# Patient Record
Sex: FEMALE | Race: OTHER | NOT HISPANIC OR LATINO | Employment: FULL TIME | URBAN - NONMETROPOLITAN AREA
[De-identification: names, ages, dates, MRNs, and addresses within clinical notes are randomized per-mention and may not be internally consistent; named-entity substitution may affect disease eponyms.]

---

## 2017-07-23 ENCOUNTER — OFFICE VISIT (OUTPATIENT)
Dept: URGENT CARE | Facility: PHYSICIAN GROUP | Age: 32
End: 2017-07-23
Payer: COMMERCIAL

## 2017-07-23 VITALS
RESPIRATION RATE: 16 BRPM | TEMPERATURE: 97.4 F | SYSTOLIC BLOOD PRESSURE: 108 MMHG | OXYGEN SATURATION: 97 % | WEIGHT: 160 LBS | HEART RATE: 80 BPM | DIASTOLIC BLOOD PRESSURE: 60 MMHG | HEIGHT: 64 IN | BODY MASS INDEX: 27.31 KG/M2

## 2017-07-23 DIAGNOSIS — S46.911A RIGHT SHOULDER STRAIN, INITIAL ENCOUNTER: ICD-10-CM

## 2017-07-23 PROCEDURE — 99214 OFFICE O/P EST MOD 30 MIN: CPT | Performed by: PHYSICIAN ASSISTANT

## 2017-07-23 RX ORDER — NAPROXEN 500 MG/1
500 TABLET ORAL 2 TIMES DAILY WITH MEALS
Qty: 30 TAB | Refills: 0 | Status: SHIPPED | OUTPATIENT
Start: 2017-07-23 | End: 2018-04-29

## 2017-07-23 NOTE — MR AVS SNAPSHOT
"        Lucia Dixon   2017 10:25 AM   Office Visit   MRN: [id number]    Department:  [facility]   Dept Phone:  [phone]    Description:  Female : 1985   Provider:  Suzie Marques PA-C           Reason for Visit     Shoulder Injury Prior injury, pain reoccurred yesterday      Allergies as of 2017     Allergen Noted Reactions    Augmentin 2012       Zith [Azithromycin] 2016   Vomiting      You were diagnosed with     Right shoulder strain, initial encounter   [[id number]]         Vital Signs     Blood Pressure Pulse Temperature Respirations Height Weight    108/60 mmHg 80 36.3 °C (97.4 °F) 16 1.626 m (5' 4\") 72.576 kg (160 lb)    Body Mass Index Oxygen Saturation Smoking Status             27.45 kg/m2 97% Never Smoker          Basic Information     Date Of Birth Sex Race Ethnicity Preferred Language    1985 Female White Non- English      Health Maintenance        Date Due Completion Dates    IMM DTaP/Tdap/Td Vaccine (1 - Tdap) 10/18/2002 ---    PAP SMEAR 10/18/2004 ---    IMM INFLUENZA (1) 2017 ---            Current Immunizations     No immunizations on file.      Below and/or attached are the medications your provider expects you to take. Review all of your home medications and newly ordered medications with your provider and/or pharmacist. Follow medication instructions as directed by your provider and/or pharmacist. Please keep your medication list with you and share with your provider. Update the information when medications are discontinued, doses are changed, or new medications (including over-the-counter products) are added; and carry medication information at all times in the event of emergency situations     Allergies:  AUGMENTIN - (reactions not documented)     ZITH - Vomiting               Medications  Valid as of: 2017 - 11:17 AM    Generic Name Brand Name Tablet Size Instructions for use    Drospirenone-Ethinyl Estradiol (Tab) " HUMBLE 3-0.02 MG Take 1 Tab by mouth every day.          Naproxen (Tab) NAPROSYN 375 MG Take 1 Tab by mouth 2 times a day, with meals.        Naproxen (Tab) NAPROSYN 500 MG Take 1 Tab by mouth 2 times a day as needed.        Naproxen (Tab) NAPROSYN 500 MG Take 1 Tab by mouth 2 times a day, with meals.        Probiotic Product   Take  by mouth.        .                 Medicines prescribed today were sent to:     [id number] - [place], [place] - [address] AT [facility] [address]    [address] [facility] [address]    Phone: [phone] Fax: [phone]    Open 24 Hours?: No      Medication refill instructions:       If your prescription bottle indicates you have medication refills left, it is not necessary to call your provider’s office. Please contact your pharmacy and they will refill your medication.    If your prescription bottle indicates you do not have any refills left, you may request refills at any time through one of the following ways: The online [facility] [facility] system (except Urgent Care), by calling your provider’s office, or by asking your pharmacy to contact your provider’s office with a refill request. Medication refills are processed only during regular business hours and may not be available until the next business day. Your provider may request additional information or to have a follow-up visit with you prior to refilling your medication.   *Please Note: Medication refills are assigned a new Rx number when refilled electronically. Your pharmacy may indicate that no refills were authorized even though a new prescription for the same medication is available at the pharmacy. Please request the medicine by name with the pharmacy before contacting your provider for a refill.           [facility] [facility] Access Code: [email or web address]  Expires: 8/22/2017 11:17 AM    [facility]  A secure, online tool to manage your health information     [email or web address]’s [employer]® is a secure, online tool that connects you to  your personalized health information from the privacy of your home -- day or night - making it very easy for you to manage your healthcare. Once the activation process is completed, you can even access your medical information using the Fluential bell, which is available for free in the Apple Bell store or Google Play store.     Fluential provides the following levels of access (as shown below):   My Chart Features   [facility] Doctor [facility]  Specialists [facility]  Urgent  Care Non-[facility]  Primary Care  Doctor   Email your healthcare team securely and privately 24/7 X X X    Manage appointments: schedule your next appointment; view details of past/upcoming appointments X      Request prescription refills. X      View recent personal medical records, including lab and immunizations X X X X   View health record, including health history, allergies, medications X X X X   Read reports about your outpatient visits, procedures, consult and ER notes X X X X   See your discharge summary, which is a recap of your hospital and/or ER visit that includes your diagnosis, lab results, and care plan. X X       How to register for [employer]:  1. Go to  [email or web address].  2. Click on the Sign Up Now box, which takes you to the New Member Sign Up page. You will need to provide the following information:  a. Enter your Fluential Access Code exactly as it appears at the top of this page. (You will not need to use this code after you’ve completed the sign-up process. If you do not sign up before the expiration date, you must request a new code.)   b. Enter your date of birth.   c. Enter your home email address.   d. Click Submit, and follow the next screen’s instructions.  3. Create a Fluential ID. This will be your Fluential login ID and cannot be changed, so think of one that is secure and easy to remember.  4. Create a Fluential password. You can change your password at any time.  5. Enter your Password Reset Question and Answer.  This can be used at a later time if you forget your password.   6. Enter your e-mail address. This allows you to receive e-mail notifications when new information is available in [employer].  7. Click Sign Up. You can now view your health information.    For assistance activating your [employer] account, call [phone]

## 2017-07-23 NOTE — PROGRESS NOTES
Chief Complaint   Patient presents with   • Shoulder Injury     Prior injury, pain reoccurred yesterday       HISTORY OF PRESENT ILLNESS: Patient is a 31 y.o. female who presents today for evaluation of right shoulder pain. Patient states she woke up yesterday morning with pain, denying any injury. She does have a history of injury approximately 2 years ago but was cleared and has not had any pain. She complains of pain deep in the right shoulder without any radiation or distal paresthesias. She has decreased range of motion due to pain. She has worsening pain with abduction. She has taken over-the-counter Tylenol and prescription naproxen with minimal relief. 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 Patient does report some mild pain in the supraclavicular region on the right side and discomfort with taking a deep breath. Patient is on birth control pills but denies any long trips, personal history of cancer or DVT.    There are no active problems to display for this patient.      Allergies:Augmentin and Zith    Current Outpatient Prescriptions Ordered in [facility]   Medication Sig Dispense Refill   • naproxen (NAPROSYN) 500 MG Tab Take 1 Tab by mouth 2 times a day, with meals. 30 Tab 0   • naproxen (NAPROSYN) 500 MG Tab Take 1 Tab by mouth 2 times a day as needed. 30 Tab 0   • naproxen (NAPROSYN) 375 MG Tab Take 1 Tab by mouth 2 times a day, with meals. 60 Tab 0   • Probiotic Product (PROBIOTIC DAILY PO) Take  by mouth.     • drospirenone-ethinyl estradiol (GIANVI) 3-0.02 MG per tablet Take 1 Tab by mouth every day.         No current Epic-ordered facility-administered medications on file.       No past medical history on file.    Social History   Substance Use Topics   • Smoking status: Never Smoker    • Smokeless tobacco: Never Used   • Alcohol Use: Yes      Comment: 1-2       Family Status   Relation Status Death Age   • Mother Alive    • Father Alive    No family history on file.    ROS:  "  Review of Systems   Constitutional: Negative for fever, chills, weight loss and malaise/fatigue.   HENT: Negative for ear pain, nosebleeds, congestion, sore throat and neck pain.    Eyes: Negative for blurred vision.   Respiratory: Negative for cough, sputum production, shortness of breath and wheezing.    Cardiovascular: Negative for chest pain, palpitations, orthopnea and leg swelling.   Gastrointestinal: Negative for heartburn, nausea, vomiting and abdominal pain.   Genitourinary: Negative for dysuria, urgency and frequency.       Exam:  Blood pressure 108/60, pulse 80, temperature 36.3 °C (97.4 °F), resp. rate 16, height 1.626 m (5' 4\"), weight 72.576 kg (160 lb), SpO2 97 %.  General: Normal appearing. No distress.  HEENT: Head is grossly normal.  Pulmonary: No respiratory distress noted.   Cardiovascular: Radial pulses are strong and equal bilaterally.  Chest: Chest wall is nontender to palpation. Mild TTP in the supraclavicular region on the right side without associated ecchymosis, erythema, or soft tissue swelling.  Neurologic: Grossly nonfocal.  Extremities: Decreased abduction of the right upper extremity due to pain. No localized tenderness noted around the shoulder joint, scapular region, or clavicle on the right side.  Skin: No obvious lesions.  Psych: Normal mood. Alert and oriented x3. Judgment and insight is normal.    Assessment/Plan:  Vital signs are within normal limits. Continue naproxen as indicated. May add acetaminophen for additional pain relief. Follow-up with primary care provider for worsening or persistent symptoms. May need to consider physical therapy.  1. Right shoulder strain, initial encounter  naproxen (NAPROSYN) 500 MG Tab         "

## 2017-08-07 ENCOUNTER — OFFICE VISIT (OUTPATIENT)
Dept: URGENT CARE | Facility: PHYSICIAN GROUP | Age: 32
End: 2017-08-07
Payer: COMMERCIAL

## 2017-08-07 VITALS
HEIGHT: 64 IN | HEART RATE: 76 BPM | WEIGHT: 160 LBS | OXYGEN SATURATION: 97 % | RESPIRATION RATE: 16 BRPM | SYSTOLIC BLOOD PRESSURE: 108 MMHG | TEMPERATURE: 98.4 F | BODY MASS INDEX: 27.31 KG/M2 | DIASTOLIC BLOOD PRESSURE: 72 MMHG

## 2017-08-07 DIAGNOSIS — L08.9: ICD-10-CM
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
## 2017-08-07 DIAGNOSIS — S60.922A: ICD-10-CM

## 2017-08-07 PROCEDURE — 99214 OFFICE O/P EST MOD 30 MIN: CPT | Performed by: PHYSICIAN ASSISTANT

## 2017-08-07 RX ORDER — DOXYCYCLINE HYCLATE 100 MG
100 TABLET ORAL 2 TIMES DAILY
Qty: 20 TAB | Refills: 0 | Status: SHIPPED | OUTPATIENT
Start: 2017-08-07 | End: 2018-04-29

## 2017-08-08 NOTE — PROGRESS NOTES
Chief Complaint   Patient presents with   • Animal Bite       HISTORY OF PRESENT ILLNESS: Patient is a 31 y.o. female who presents today for evaluation of a dog bite on her left hand. Patient states her dog bit her in a non-vicious manner and has had worsening pain, redness, and swelling over the first metatarsal on the left hand. Patient denies any fevers, chills, chills, drainage from the wound.    There are no active problems to display for this patient.      Allergies:Augmentin and Zith    Current Outpatient Prescriptions Ordered in [facility]   Medication Sig Dispense Refill   • doxycycline (VIBRAMYCIN) 100 MG Tab Take 1 Tab by mouth 2 times a day. 20 Tab 0   • naproxen (NAPROSYN) 500 MG Tab Take 1 Tab by mouth 2 times a day, with meals. 30 Tab 0   • naproxen (NAPROSYN) 500 MG Tab Take 1 Tab by mouth 2 times a day as needed. 30 Tab 0   • Probiotic Product (PROBIOTIC DAILY PO) Take  by mouth.     • naproxen (NAPROSYN) 375 MG Tab Take 1 Tab by mouth 2 times a day, with meals. 60 Tab 0   • drospirenone-ethinyl estradiol (GIANVI) 3-0.02 MG per tablet Take 1 Tab by mouth every day.         No current Epic-ordered facility-administered medications on file.       No past medical history on file.    Social History   Substance Use Topics   • Smoking status: Never Smoker    • Smokeless tobacco: Never Used   • Alcohol Use: Yes      Comment: 1-2       Family Status   Relation Status Death Age   • Mother Alive    • Father Alive    No family history on file.    ROS:    Review of Systems   Constitutional: Negative for fever, chills, weight loss and malaise/fatigue.   HENT: Negative for ear pain, nosebleeds, congestion, sore throat and neck pain.    Eyes: Negative for blurred vision.   Respiratory: Negative for cough, sputum production, shortness of breath and wheezing.    Cardiovascular: Negative for chest pain, palpitations, orthopnea and leg swelling.   Gastrointestinal: Negative for heartburn, nausea, vomiting and abdominal  "pain.   Genitourinary: Negative for dysuria, urgency and frequency.       Exam:  Blood pressure 108/72, pulse 76, temperature 36.9 °C (98.4 °F), resp. rate 16, height 1.626 m (5' 4.02\"), weight 72.576 kg (160 lb), SpO2 97 %.  General: Normal appearing. No distress.  HEENT: Head is grossly normal.  Pulmonary: No respiratory distress noted.  Cardiovascular: Cap refill is less than 2 seconds in the left hand.  Neurologic: No sensory deficit noted.  Extremities: Trace edema noted over the first metacarpal of the left hand with associated redness and tenderness.  Skin: 2 cm abrasion noted over the first metacarpal of the left hand.  Psych: Normal mood. Alert and oriented x3. Judgment and insight is normal.    Assessment/Plan:  Take all medications as directed. Follow up for worsening or persistent symptoms.  1. Dog bite, initial encounter     2. Injury, superficial, hand with infection, left, initial encounter  doxycycline (VIBRAMYCIN) 100 MG Tab         "

## 2017-09-18 ENCOUNTER — OFFICE VISIT (OUTPATIENT)
Dept: URGENT CARE | Facility: CLINIC | Age: 32
End: 2017-09-18
Payer: COMMERCIAL

## 2017-09-18 VITALS
WEIGHT: 160 LBS | BODY MASS INDEX: 27.31 KG/M2 | SYSTOLIC BLOOD PRESSURE: 108 MMHG | DIASTOLIC BLOOD PRESSURE: 68 MMHG | HEIGHT: 64 IN | HEART RATE: 73 BPM | TEMPERATURE: 98.7 F | OXYGEN SATURATION: 100 % | RESPIRATION RATE: 14 BRPM

## 2017-09-18 DIAGNOSIS — S61.258A DOG BITE OF INDEX FINGER, INITIAL ENCOUNTER: ICD-10-CM
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
## 2017-09-18 PROCEDURE — 90471 IMMUNIZATION ADMIN: CPT | Performed by: PHYSICIAN ASSISTANT

## 2017-09-18 PROCEDURE — 99214 OFFICE O/P EST MOD 30 MIN: CPT | Mod: 25 | Performed by: PHYSICIAN ASSISTANT

## 2017-09-18 PROCEDURE — 90715 TDAP VACCINE 7 YRS/> IM: CPT | Performed by: PHYSICIAN ASSISTANT

## 2017-09-18 RX ORDER — CLINDAMYCIN HYDROCHLORIDE 300 MG/1
300 CAPSULE ORAL 4 TIMES DAILY
Qty: 28 CAP | Refills: 0 | Status: SHIPPED | OUTPATIENT
Start: 2017-09-18 | End: 2017-09-25

## 2017-09-18 RX ORDER — SULFAMETHOXAZOLE AND TRIMETHOPRIM 800; 160 MG/1; MG/1
1 TABLET ORAL 2 TIMES DAILY
Qty: 14 TAB | Refills: 0 | Status: SHIPPED | OUTPATIENT
Start: 2017-09-18 | End: 2017-09-25

## 2017-09-18 ASSESSMENT — ENCOUNTER SYMPTOMS
FOCAL WEAKNESS: 0
VOMITING: 0
FEVER: 0
NUMBNESS: 0
WEAKNESS: 0
NAUSEA: 0
HEADACHES: 0
TINGLING: 0
SENSORY CHANGE: 0

## 2017-09-18 NOTE — PROGRESS NOTES
"Subjective:      Lucia Dixon is a 31 y.o. female who presents with Dog Bite ((L) index x 1 day)            Animal Bite   This is a new problem. The current episode started yesterday (patient's dog bit her right index finger yesterday). The problem occurs constantly. The problem has been gradually worsening (worsening right index finger redness and swelling). Pertinent negatives include no fever, headaches, nausea, numbness, vomiting or weakness. Exacerbated by: bending  Treatments tried: started Doxycycline yesterday, wound cleansing, antibiotic ointment  The treatment provided no relief.   Dog is vaccinated. TDAP status is NOT UTD.    No past medical history on file.  No past surgical history on file.    History reviewed. No pertinent family history.    Allergies   Allergen Reactions   • Augmentin    • Erythromycin      Vomiting         Medications, Allergies, and current problem list reviewed today in Epic      Review of Systems   Constitutional: Negative for fever and malaise/fatigue.   Gastrointestinal: Negative for nausea and vomiting.   Musculoskeletal: Negative for joint pain.   Skin:        Right index finger puncture wounds with redness and swelling.   Neurological: Negative for tingling, sensory change, focal weakness, weakness, numbness and headaches.     All other systems reviewed and are negative.        Objective:     /68   Pulse 73   Temp 37.1 °C (98.7 °F)   Resp 14   Ht 1.626 m (5' 4\")   Wt 72.6 kg (160 lb)   SpO2 100%   BMI 27.46 kg/m²      Physical Exam   Constitutional: She is oriented to person, place, and time. She appears well-developed and well-nourished. No distress.   HENT:   Head: Normocephalic and atraumatic.   Cardiovascular: Normal rate, regular rhythm and normal heart sounds.  Exam reveals no gallop and no friction rub.    No murmur heard.  Pulmonary/Chest: Effort normal and breath sounds normal. No respiratory distress. She has no wheezes. She has no rales. "   Musculoskeletal:        Hands:  Neurological: She is alert and oriented to person, place, and time. No cranial nerve deficit.               Assessment/Plan:     1. Dog bite of index finger, initial encounter    - clindamycin (CLEOCIN) 300 MG Cap; Take 1 Cap by mouth 4 times a day for 7 days.  Dispense: 28 Cap; Refill: 0  - sulfamethoxazole-trimethoprim (BACTRIM DS) 800-160 MG tablet; Take 1 Tab by mouth 2 times a day for 7 days.  Dispense: 14 Tab; Refill: 0  - Tdap =>6yo IM  - wound care discussed.  - wound dressed.  Advised follow-up if any worsening of swelling, pain, redness, or fever.     Differential diagnoses, Supportive care, and indications for immediate follow-up discussed with patient.   Instructed to return to clinic or nearest emergency department for any change in condition, further concerns, or worsening of symptoms.    The patient demonstrated a good understanding and agreed with the treatment plan.    Rosina Echeverria P.A.-C.

## 2018-01-04 ENCOUNTER — HOSPITAL ENCOUNTER (OUTPATIENT)
Dept: LAB | Facility: MEDICAL CENTER | Age: 33
End: 2018-01-04
Attending: SPECIALIST
Payer: COMMERCIAL

## 2018-01-04 PROCEDURE — 88175 CYTOPATH C/V AUTO FLUID REDO: CPT

## 2018-01-04 PROCEDURE — 87624 HPV HI-RISK TYP POOLED RSLT: CPT

## 2018-01-05 LAB
CYTOLOGY REG CYTOL: NORMAL
HPV HR 12 DNA CVX QL NAA+PROBE: NEGATIVE
HPV16 DNA SPEC QL NAA+PROBE: NEGATIVE
HPV18 DNA SPEC QL NAA+PROBE: NEGATIVE
SPECIMEN SOURCE: NORMAL

## 2018-04-29 ENCOUNTER — APPOINTMENT (OUTPATIENT)
Dept: RADIOLOGY | Facility: IMAGING CENTER | Age: 33
End: 2018-04-29
Attending: PHYSICIAN ASSISTANT
Payer: COMMERCIAL

## 2018-04-29 ENCOUNTER — OFFICE VISIT (OUTPATIENT)
Dept: URGENT CARE | Facility: PHYSICIAN GROUP | Age: 33
End: 2018-04-29
Payer: COMMERCIAL

## 2018-04-29 VITALS
SYSTOLIC BLOOD PRESSURE: 110 MMHG | TEMPERATURE: 98.3 F | OXYGEN SATURATION: 97 % | WEIGHT: 158 LBS | HEART RATE: 79 BPM | HEIGHT: 64 IN | BODY MASS INDEX: 26.98 KG/M2 | RESPIRATION RATE: 16 BRPM | DIASTOLIC BLOOD PRESSURE: 70 MMHG

## 2018-04-29 DIAGNOSIS — S63.617A SPRAIN OF LEFT LITTLE FINGER, UNSPECIFIED SITE OF FINGER, INITIAL ENCOUNTER: ICD-10-CM

## 2018-04-29 DIAGNOSIS — S62.647A CLOSED NONDISPLACED FRACTURE OF PROXIMAL PHALANX OF LEFT LITTLE FINGER, INITIAL ENCOUNTER: ICD-10-CM

## 2018-04-29 PROCEDURE — 99214 OFFICE O/P EST MOD 30 MIN: CPT | Performed by: PHYSICIAN ASSISTANT

## 2018-04-29 PROCEDURE — 73130 X-RAY EXAM OF HAND: CPT | Mod: TC,FY,LT | Performed by: PHYSICIAN ASSISTANT

## 2018-04-29 ASSESSMENT — PAIN SCALES - GENERAL: PAINLEVEL: 7=MODERATE-SEVERE PAIN

## 2018-04-29 NOTE — PROGRESS NOTES
"Chief Complaint   Patient presents with   • Finger Injury     got it caught in a halter when the horse pulled back       HISTORY OF PRESENT ILLNESS: Patient is a 32 y.o. female who presents today for the following:    Patient comes in for evaluation of pain and swelling of her left fifth finger. She got it caught in a halter of a horse 3 days ago. She has decreased range of motion due to pain and swelling. 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    There are no active problems to display for this patient.      Allergies:Augmentin and Erythromycin    Current Outpatient Prescriptions Ordered in [facility]   Medication Sig Dispense Refill   • drospirenone-ethinyl estradiol (GIANVI) 3-0.02 MG per tablet Take 1 Tab by mouth every day.         No current Epic-ordered facility-administered medications on file.        History reviewed. No pertinent past medical history.    Social History   Substance Use Topics   • Smoking status: Never Smoker   • Smokeless tobacco: Never Used   • Alcohol use Yes      Comment: 1-2       Family Status   Relation Status   • Mother Alive   • Father Alive   History reviewed. No pertinent family history.    ROS:   Review of Systems   Constitutional: Negative for fever, chills, weight loss and malaise/fatigue.   HENT: Negative for ear pain, nosebleeds, congestion, sore throat and neck pain.    Eyes: Negative for blurred vision.   Respiratory: Negative for cough, sputum production, shortness of breath and wheezing.    Cardiovascular: Negative for chest pain, palpitations, orthopnea and leg swelling.   Gastrointestinal: Negative for heartburn, nausea, vomiting and abdominal pain.   Genitourinary: Negative for dysuria, urgency and frequency.       Exam:  Blood pressure 110/70, pulse 79, temperature 36.8 °C (98.3 °F), resp. rate 16, height 1.626 m (5' 4\"), weight 71.7 kg (158 lb), last menstrual period 02/28/2018, SpO2 97 %.  General: Well developed, well nourished. No " distress.  HEENT: Head is grossly normal.  Pulmonary: No respiratory distress noted.  Cardiovascular: Cap refill is less than 2 seconds in the fifth finger.  Extremities: Diffuse edema and mild ecchymosis of the left fifth finger. Decreased range of motion at the MCP, PIP, DIP but does have some flexion. No sensory deficit noted.  Psych: Normal mood. Alert and oriented x3. Judgment and insight is normal.    Left hand x-ray, per radiology:  Impression       Nondisplaced obliquely oriented fracture involving the fifth proximal phalangeal diaphysis.     Assessment/Plan:  Ulnar gutter splint placed. 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 Patient declined pain medication. Understands to use ibuprofen and acetaminophen as needed for pain.  1. Closed nondisplaced fracture of proximal phalanx of left little finger, initial encounter  DX-HAND 3+ LEFT    REFERRAL TO ORTHOPEDICS

## 2018-10-31 ENCOUNTER — HOSPITAL ENCOUNTER (OUTPATIENT)
Dept: RADIOLOGY | Facility: MEDICAL CENTER | Age: 33
End: 2018-10-31
Attending: SPECIALIST
Payer: COMMERCIAL

## 2018-10-31 DIAGNOSIS — N63.41 UNSPECIFIED LUMP IN RIGHT BREAST, SUBAREOLAR: ICD-10-CM

## 2018-10-31 PROCEDURE — 76642 ULTRASOUND BREAST LIMITED: CPT | Mod: RT

## 2018-10-31 PROCEDURE — G0279 TOMOSYNTHESIS, MAMMO: HCPCS

## 2018-11-15 ENCOUNTER — APPOINTMENT (OUTPATIENT)
Dept: RADIOLOGY | Facility: MEDICAL CENTER | Age: 33
End: 2018-11-15
Attending: SURGERY
Payer: COMMERCIAL

## 2018-11-19 ENCOUNTER — HOSPITAL ENCOUNTER (OUTPATIENT)
Dept: LAB | Facility: MEDICAL CENTER | Age: 33
End: 2018-11-19
Attending: SURGERY
Payer: COMMERCIAL

## 2018-11-19 LAB — PATHOLOGY CONSULT NOTE: NORMAL

## 2018-11-19 PROCEDURE — 88305 TISSUE EXAM BY PATHOLOGIST: CPT

## 2019-01-14 ENCOUNTER — HOSPITAL ENCOUNTER (OUTPATIENT)
Dept: LAB | Facility: MEDICAL CENTER | Age: 34
End: 2019-01-14
Attending: SPECIALIST
Payer: COMMERCIAL

## 2019-01-14 PROCEDURE — 87591 N.GONORRHOEAE DNA AMP PROB: CPT

## 2019-01-14 PROCEDURE — 87491 CHLMYD TRACH DNA AMP PROBE: CPT

## 2019-01-15 LAB
C TRACH DNA SPEC QL NAA+PROBE: NEGATIVE
N GONORRHOEA DNA SPEC QL NAA+PROBE: NEGATIVE
SPECIMEN SOURCE: NORMAL

## 2019-05-15 ENCOUNTER — HOSPITAL ENCOUNTER (OUTPATIENT)
Dept: RADIOLOGY | Facility: MEDICAL CENTER | Age: 34
End: 2019-05-15
Attending: SURGERY
Payer: COMMERCIAL

## 2019-05-15 DIAGNOSIS — N63.15 BREAST LUMP ON RIGHT SIDE AT 3 O'CLOCK POSITION: ICD-10-CM

## 2019-05-15 DIAGNOSIS — N63.25 BREAST LUMP ON LEFT SIDE AT 3 O'CLOCK POSITION: ICD-10-CM

## 2019-05-15 PROCEDURE — G0279 TOMOSYNTHESIS, MAMMO: HCPCS

## 2019-10-10 ENCOUNTER — HOSPITAL ENCOUNTER (OUTPATIENT)
Dept: LAB | Facility: MEDICAL CENTER | Age: 34
End: 2019-10-10
Attending: FAMILY MEDICINE
Payer: COMMERCIAL

## 2019-10-10 LAB — RHEUMATOID FACT SER IA-ACNC: <10 IU/ML (ref 0–14)

## 2019-10-10 PROCEDURE — 86038 ANTINUCLEAR ANTIBODIES: CPT

## 2019-10-10 PROCEDURE — 36415 COLL VENOUS BLD VENIPUNCTURE: CPT

## 2019-10-10 PROCEDURE — 86431 RHEUMATOID FACTOR QUANT: CPT

## 2019-10-12 LAB — NUCLEAR IGG SER QL IA: NORMAL

## 2020-02-04 ENCOUNTER — APPOINTMENT (RX ONLY)
Dept: URBAN - METROPOLITAN AREA CLINIC 31 | Facility: CLINIC | Age: 35
Setting detail: DERMATOLOGY
End: 2020-02-04

## 2020-02-04 DIAGNOSIS — D22 MELANOCYTIC NEVI: ICD-10-CM

## 2020-02-04 PROBLEM — D23.39 OTHER BENIGN NEOPLASM OF SKIN OF OTHER PARTS OF FACE: Status: ACTIVE | Noted: 2020-02-04

## 2020-02-04 PROBLEM — D22.5 MELANOCYTIC NEVI OF TRUNK: Status: ACTIVE | Noted: 2020-02-04

## 2020-02-04 PROCEDURE — ? COUNSELING

## 2020-02-04 PROCEDURE — 99202 OFFICE O/P NEW SF 15 MIN: CPT

## 2020-02-04 ASSESSMENT — LOCATION ZONE DERM: LOCATION ZONE: TRUNK

## 2020-02-04 ASSESSMENT — LOCATION SIMPLE DESCRIPTION DERM: LOCATION SIMPLE: ABDOMEN

## 2020-02-04 ASSESSMENT — LOCATION DETAILED DESCRIPTION DERM: LOCATION DETAILED: PERIUMBILICAL SKIN

## 2020-02-04 NOTE — PROCEDURE: REASSURANCE
Hide Additional Notes?: No
Detail Level: Simple
Additional Notes (Optional): Pt amenable to expectant management. Declines option to confirm Dx via Bx at this time. \\nF/u in event of enlargement, change, bleeding, or worsening. Pt verbalizes understanding.
Detail Level: Zone

## 2020-02-04 NOTE — HPI: SKIN LESIONS
Is This A New Presentation, Or A Follow-Up?: Skin Lesions
How Severe Is Your Skin Lesion?: moderate
Have Your Skin Lesions Been Treated?: not been treated
Which Family Member (Optional)?: Grandmother

## 2020-02-25 ENCOUNTER — OFFICE VISIT (OUTPATIENT)
Dept: URGENT CARE | Facility: PHYSICIAN GROUP | Age: 35
End: 2020-02-25
Payer: COMMERCIAL

## 2020-02-25 VITALS
WEIGHT: 165 LBS | HEIGHT: 65 IN | TEMPERATURE: 100.6 F | DIASTOLIC BLOOD PRESSURE: 60 MMHG | HEART RATE: 100 BPM | BODY MASS INDEX: 27.49 KG/M2 | RESPIRATION RATE: 16 BRPM | SYSTOLIC BLOOD PRESSURE: 110 MMHG | OXYGEN SATURATION: 99 %

## 2020-02-25 DIAGNOSIS — J10.1 INFLUENZA A: ICD-10-CM

## 2020-02-25 DIAGNOSIS — R50.9 FEVER, UNSPECIFIED FEVER CAUSE: ICD-10-CM

## 2020-02-25 LAB
FLUAV+FLUBV AG SPEC QL IA: POSITIVE
INT CON NEG: NORMAL
INT CON POS: NORMAL

## 2020-02-25 PROCEDURE — 99214 OFFICE O/P EST MOD 30 MIN: CPT | Performed by: PHYSICIAN ASSISTANT

## 2020-02-25 PROCEDURE — 87804 INFLUENZA ASSAY W/OPTIC: CPT | Performed by: PHYSICIAN ASSISTANT

## 2020-02-25 RX ORDER — OSELTAMIVIR PHOSPHATE 75 MG/1
75 CAPSULE ORAL 2 TIMES DAILY
Qty: 10 CAP | Refills: 0 | Status: SHIPPED | OUTPATIENT
Start: 2020-02-25 | End: 2020-03-01

## 2020-02-25 SDOH — HEALTH STABILITY: MENTAL HEALTH: HOW MANY STANDARD DRINKS CONTAINING ALCOHOL DO YOU HAVE ON A TYPICAL DAY?: 1 OR 2

## 2020-02-25 SDOH — HEALTH STABILITY: MENTAL HEALTH: HOW OFTEN DO YOU HAVE A DRINK CONTAINING ALCOHOL?: MONTHLY OR LESS

## 2020-02-26 NOTE — PROGRESS NOTES
Chief Complaint   Patient presents with   • Nasal Congestion     Pt reports chest congestion, cough (yellow/green), fevers (100.6-101.3), body aches, fatigued, head aches. Onset 3 days       HISTORY OF PRESENT ILLNESS: Patient is a 34 y.o. female who presents today because she has a 2-day history of acute onset of fever, chills, body aches, coughing.  She has been taking over-the-counter medications without improvement.  She did not get her flu shot this year    There are no active problems to display for this patient.      Allergies:Augmentin and Erythromycin    Current Outpatient Medications Ordered in Epic   Medication Sig Dispense Refill   • oseltamivir (TAMIFLU) 75 MG Cap Take 1 Cap by mouth 2 times a day for 5 days. 10 Cap 0   • drospirenone-ethinyl estradiol (GIANVI) 3-0.02 MG per tablet Take 1 Tab by mouth every day.         No current Epic-ordered facility-administered medications on file.        History reviewed. No pertinent past medical history.    Social History     Tobacco Use   • Smoking status: Never Smoker   • Smokeless tobacco: Never Used   Substance Use Topics   • Alcohol use: Yes     Frequency: Monthly or less     Drinks per session: 1 or 2     Comment: 1-2   • Drug use: No       Family Status   Relation Name Status   • Mo  Alive   • Fa  Alive   History reviewed. No pertinent family history.    ROS:  Review of Systems   Constitutional: Positive for fever, chills, myalgias and malaise/fatigue.   HENT: Negative for ear pain, nosebleeds, congestion, sore throat and neck pain.    Eyes: Negative for blurred vision.   Respiratory: Positive for cough, no sputum production, shortness of breath and wheezing.    Cardiovascular: Negative for chest pain, palpitations, orthopnea and leg swelling.   Gastrointestinal: Negative for heartburn, nausea, vomiting and abdominal pain.   Genitourinary: Negative for dysuria, urgency and frequency.     Exam:  /60 (BP Location: Right arm, Patient Position: Sitting,  "BP Cuff Size: Adult)   Pulse 100   Temp (!) 38.1 °C (100.6 °F) (Oral)   Resp 16   Ht 1.638 m (5' 4.5\")   Wt 74.8 kg (165 lb)   SpO2 99%   General:  Well nourished, well developed female in NAD  Head:Normocephalic, atraumatic  Eyes: PERRLA, EOM within normal limits, no conjunctival injection, no scleral icterus, visual fields and acuity grossly intact.  Ears: Normal shape and symmetry, no tenderness, no discharge. External canals are without any significant edema or erythema. Tympanic membranes are without any inflammation, no effusion. Gross auditory acuity is intact  Nose: Symmetrical without tenderness, no discharge.  Mouth: reasonable hygiene, no erythema exudates or tonsillar enlargement.  Neck: no masses, range of motion within normal limits, no tracheal deviation. No obvious thyroid enlargement.  Pulmonary: chest is symmetrical with respiration, no wheezes, crackles, or rhonchi.  Cardiovascular: regular rate and rhythm without murmurs, rubs, or gallops.  Extremities: no clubbing, cyanosis, or edema.    Influenza test is positive for influenza A    Please note that this dictation was created using voice recognition software. I have made every reasonable attempt to correct obvious errors, but I expect that there are errors of grammar and possibly content that I did not discover before finalizing the note.    Assessment/Plan:  1. Influenza A  oseltamivir (TAMIFLU) 75 MG Cap   2. Fever, unspecified fever cause  POCT Influenza A/B   Tylenol ibuprofen, increase p.o. fluids, decongestants as needed    Followup with primary care in the next 7-10 days if not significantly improving, return to the urgent care or go to the emergency room sooner for any worsening of symptoms.       "

## 2020-09-16 ENCOUNTER — TELEMEDICINE (OUTPATIENT)
Dept: TELEHEALTH | Facility: TELEMEDICINE | Age: 35
End: 2020-09-16
Payer: COMMERCIAL

## 2020-09-16 VITALS — RESPIRATION RATE: 16 BRPM | WEIGHT: 151 LBS | BODY MASS INDEX: 25.16 KG/M2 | HEIGHT: 65 IN

## 2020-09-16 DIAGNOSIS — J01.90 ACUTE SINUSITIS, RECURRENCE NOT SPECIFIED, UNSPECIFIED LOCATION: ICD-10-CM

## 2020-09-16 PROCEDURE — 99213 OFFICE O/P EST LOW 20 MIN: CPT | Mod: 95,CR | Performed by: NURSE PRACTITIONER

## 2020-09-16 NOTE — PROGRESS NOTES
Virtual Visit: Established Patient   This visit was conducted via Zoom using secure and encrypted videoconferencing technology. The patient was in a private location in the state [facility]  The patient's identity was confirmed and verbal consent was obtained for this virtual visit.    Subjective:   CC: Nasal congestion, painful sinuses Sinus pressure    Lucia Dixon is a 34 y.o. female presenting for evaluation and management of: sinus pain, pressure, congestion and headache with lymph node enlargement in the neck with clear drainage that started 4 days ago.  Has taken tylenol, benadryl sinus congestion medication and allergy medications. Denies fever, chills, nausea, vomiting, SOB or wheezing, rash or dizziness.     ROS   Denies any recent fevers or chills. No nausea or vomiting. No chest pains or shortness of breath.     Allergies   Allergen Reactions   • Augmentin    • Erythromycin      Vomiting         Current medicines (including changes today)  Current Outpatient Medications   Medication Sig Dispense Refill   • drospirenone-ethinyl estradiol (GIANVI) 3-0.02 MG per tablet Take 1 Tab by mouth every day.         No current facility-administered medications for this visit.        There are no active problems to display for this patient.      History reviewed. No pertinent family history.    She  has no past medical history on file.  She  has a past surgical history that includes breast biopsy (Bilateral, 11/2018).       Objective:   There were no vitals taken for this visit.    Physical Exam:  Constitutional: Alert, no distress, well-groomed.  Skin: No rashes in visible areas.  Eye: Round. Conjunctiva clear, lids normal. No icterus.   ENMT: Lips pink without lesions, good dentition, moist mucous membranes. Phonation normal. noticible congestion  Neck: No masses, no thyromegaly. Moves freely without pain.  Respiratory: Unlabored respiratory effort, no cough or audible wheeze  Psych: Alert and oriented x3,  normal affect and mood.       Assessment and Plan:   The following treatment plan was discussed:     1. Acute sinusitis, recurrence not specified, unspecified location    Discussed PE findings and patient reported symptoms are consistent with sinusitis due to environmental allergies or virus. Discussed supportive care including OTC NSAIDS and tylenol per 's instruction as well s continuation of xyzol, starting OTC flonase and starting sudafed during the day and continuing with the benadryl cold and sinus at night. Discussed that she should be seen again if symptoms worsen and drainage becomes pruulent as antibiotics may be indicated at that time.     Supportive care, differential diagnoses, and indications for immediate follow-up discussed with patient.    Pathogenesis of diagnosis discussed including typical length and natural progression. Patient expresses understanding and agrees to plan.    Instructed patient to return to clinic for worsening symptoms or symptoms that persist for 7 to 10 days       Please note that this dictation was created using voice recognition software. I have made every reasonable attempt to correct obvious errors, but I expect that there are errors of grammar and possibly content that I did not discover before finalizing the note.    Note electronically signed by TREMAINE Vieira       Follow-up: No follow-ups on file.

## 2020-10-02 ENCOUNTER — OFFICE VISIT (OUTPATIENT)
Dept: MEDICAL GROUP | Facility: PHYSICIAN GROUP | Age: 35
End: 2020-10-02
Payer: COMMERCIAL

## 2020-10-02 VITALS
HEART RATE: 74 BPM | HEIGHT: 65 IN | BODY MASS INDEX: 26.33 KG/M2 | TEMPERATURE: 97.3 F | SYSTOLIC BLOOD PRESSURE: 100 MMHG | WEIGHT: 158 LBS | OXYGEN SATURATION: 99 % | RESPIRATION RATE: 12 BRPM | DIASTOLIC BLOOD PRESSURE: 72 MMHG

## 2020-10-02 DIAGNOSIS — Z00.00 WELLNESS EXAMINATION: ICD-10-CM

## 2020-10-02 DIAGNOSIS — G56.01 CARPAL TUNNEL SYNDROME OF RIGHT WRIST: ICD-10-CM

## 2020-10-02 PROCEDURE — 99385 PREV VISIT NEW AGE 18-39: CPT | Performed by: FAMILY MEDICINE

## 2020-10-02 ASSESSMENT — PATIENT HEALTH QUESTIONNAIRE - PHQ9: CLINICAL INTERPRETATION OF PHQ2 SCORE: 0

## 2020-10-02 NOTE — PROGRESS NOTES
CC:  Diagnoses of Wellness examination and Carpal tunnel syndrome of right wrist were pertinent to this visit.    HISTORY OF THE PRESENT ILLNESS: Patient is a 34 y.o. female. This pleasant patient is here today to establish care.  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  She would like to have her wellness exam for her insurance.    Health Maintenance: Completed      Wellness examination  This patient is very healthy.  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  We will get some baseline blood work on her and then we can talk with her via [employer].    Allergies: Augmentin and Erythromycin    No current Epic-ordered outpatient medications on file.     No current Epic-ordered facility-administered medications on file.        Past Medical History:   Diagnosis Date   • Carpal tunnel syndrome of right wrist 10/2/2020   • Wellness examination 10/2/2020       Past Surgical History:   Procedure Laterality Date   • BREAST BIOPSY Bilateral 11/2018    benign       Social History     Tobacco Use   • Smoking status: Never Smoker   • Smokeless tobacco: Never Used   Substance Use Topics   • Alcohol use: Yes     Frequency: Monthly or less     Drinks per session: 1 or 2     Comment: 1-2   • Drug use: No       Social History     Social History Narrative   • Not on file       Family History   Problem Relation Age of Onset   • Arthritis Mother         rheumatoid arthritis   • Lung Disease Mother         bronchiectasis   • Hyperlipidemia Father    • Diabetes Maternal Grandmother    • Cancer Paternal Grandmother         colon       ROS:     - Constitutional: Negative for fever, chills, unexpected weight change, and fatigue/generalized weakness.     - HEENT: Negative for headaches, vision changes, hearing changes, ear pain, ear discharge, rhinorrhea, sinus congestion, sore throat, and neck pain.      - Respiratory: Negative for cough, sputum  "production, chest congestion, dyspnea, wheezing, and crackles.      - Cardiovascular: Negative for chest pain, palpitations, orthopnea, and bilateral lower extremity edema.     - Gastrointestinal: Negative for heartburn, nausea, vomiting, abdominal pain, hematochezia, melena, diarrhea, constipation, and greasy/foul-smelling stools.     - Genitourinary: Negative for dysuria, polyuria, hematuria, pyuria, urinary urgency, and urinary incontinence.     - Musculoskeletal: Negative for myalgias, back pain, and joint pain.     - Skin: Negative for rash, itching, cyanotic skin color change.     - Neurological: Negative for dizziness, tingling, tremors, focal sensory deficit, focal weakness and headaches.     - Endo/Heme/Allergies: Does not bruise/bleed easily.     - Psychiatric/Behavioral: Negative for depression, suicidal/homicidal ideation and memory loss.        - NOTE: All other systems reviewed and are negative, except as in HPI.      .      Exam: /72 (BP Location: Left arm, Patient Position: Sitting, BP Cuff Size: Adult)   Pulse 74   Temp 36.3 °C (97.3 °F) (Temporal)   Resp 12   Ht 1.651 m (5' 5\")   Wt 71.7 kg (158 lb)   SpO2 99%  Body mass index is 26.29 kg/m².    General: Normal appearing. No distress.  HEENT: Normocephalic. Eyes conjunctiva clear lids without ptosis, pupils equal and reactive to light accommodation, ears normal shape and contour, canals are clear bilaterally, tympanic membranes are benign, nasal mucosa benign, oropharynx is without erythema, edema or exudates.   Neck: Supple without JVD or bruit. Thyroid is not enlarged.  Pulmonary: Clear to ausculation.  Normal effort. No rales, ronchi, or wheezing.  Cardiovascular: Regular rate and rhythm without murmur. Carotid and radial pulses are intact and equal bilaterally.  Abdomen: Soft, nontender, nondistended. Normal bowel sounds. Liver and spleen are not palpable  Neurologic: Grossly nonfocal  Lymph: No cervical, supraclavicular or axillary " lymph nodes are palpable  Skin: Warm and dry.  No obvious lesions.  Musculoskeletal: Normal gait. No extremity cyanosis, clubbing, or edema.  Psych: Normal mood and affect. Alert and oriented x3. Judgment and insight is normal.    Please note that this dictation was created using voice recognition software. I have made every reasonable attempt to correct obvious errors, but I expect that there are errors of grammar and possibly content that I did not discover before finalizing the note.      Assessment/Plan  1. Wellness examination  Patient is very healthy.  We will have her go ahead and get her lab work and then we will notify her of results via [employer].  If there is anything concerning we will have her return.  - Comp Metabolic Panel; Future  - Lipid Profile; Future  - CBC WITHOUT DIFFERENTIAL; Future    2. Carpal tunnel syndrome of right wrist  Patient planning to have her carpal tunnel operated on in the wintertime with older Dr. Brooke at [facility]

## 2020-10-02 NOTE — ASSESSMENT & PLAN NOTE
This patient is very healthy.  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  We will get some baseline blood work on her and then we can talk with her via [employer].

## 2020-11-06 ENCOUNTER — OFFICE VISIT (OUTPATIENT)
Dept: URGENT CARE | Facility: PHYSICIAN GROUP | Age: 35
End: 2020-11-06
Payer: COMMERCIAL

## 2020-11-06 ENCOUNTER — APPOINTMENT (OUTPATIENT)
Dept: RADIOLOGY | Facility: IMAGING CENTER | Age: 35
End: 2020-11-06
Attending: PHYSICIAN ASSISTANT
Payer: COMMERCIAL

## 2020-11-06 VITALS
SYSTOLIC BLOOD PRESSURE: 102 MMHG | TEMPERATURE: 98.7 F | HEART RATE: 82 BPM | BODY MASS INDEX: 26.66 KG/M2 | DIASTOLIC BLOOD PRESSURE: 70 MMHG | HEIGHT: 65 IN | RESPIRATION RATE: 14 BRPM | OXYGEN SATURATION: 98 % | WEIGHT: 160 LBS

## 2020-11-06 DIAGNOSIS — S93.402A INVERSION SPRAIN OF LEFT ANKLE, INITIAL ENCOUNTER: ICD-10-CM

## 2020-11-06 PROCEDURE — 99213 OFFICE O/P EST LOW 20 MIN: CPT | Performed by: PHYSICIAN ASSISTANT

## 2020-11-06 PROCEDURE — 73610 X-RAY EXAM OF ANKLE: CPT | Mod: TC,FY,LT | Performed by: PHYSICIAN ASSISTANT

## 2020-11-06 ASSESSMENT — ENCOUNTER SYMPTOMS
NUMBNESS: 0
LOSS OF MOTION: 0
INABILITY TO BEAR WEIGHT: 0
LOSS OF SENSATION: 0
MUSCLE WEAKNESS: 0
TINGLING: 0

## 2020-11-06 NOTE — PROGRESS NOTES
"  Subjective:   Lucia Dixon is a 35 y.o. female who presents today with   Chief Complaint   Patient presents with   • Ankle Injury     LT ankle injury x today       Ankle Injury   The incident occurred 6 to 12 hours ago. The incident occurred at home. The injury mechanism was an inversion injury. The pain is present in the left ankle. The quality of the pain is described as aching. The pain is moderate. The pain has been constant since onset. Pertinent negatives include no inability to bear weight, loss of motion, loss of sensation, muscle weakness, numbness or tingling. She reports no foreign bodies present. The symptoms are aggravated by palpation, movement and weight bearing. She has tried ice and elevation for the symptoms. The treatment provided mild relief.       PMH:  has a past medical history of Carpal tunnel syndrome of right wrist (10/2/2020) and Wellness examination (10/2/2020).  MEDS: No current outpatient medications on file.  ALLERGIES:   Allergies   Allergen Reactions   • Augmentin    • Erythromycin      Vomiting       SURGHX:   Past Surgical History:   Procedure Laterality Date   • BREAST BIOPSY Bilateral 11/2018    benign     SOCHX:  reports that she has never smoked. She has never used smokeless tobacco. She reports current alcohol use. She reports that she does not use drugs.  FH: Reviewed with patient, not pertinent to this visit.       Review of Systems   Musculoskeletal:        Left ankle pain   Neurological: Negative for tingling and numbness.   All other systems reviewed and are negative.       Objective:   /70   Pulse 82   Temp 37.1 °C (98.7 °F) (Temporal)   Resp 14   Ht 1.651 m (5' 5\")   Wt 72.6 kg (160 lb)   SpO2 98%   BMI 26.63 kg/m²   Physical Exam  Vitals signs and nursing note reviewed.   Constitutional:       General: She is not in acute distress.     Appearance: She is well-developed.   HENT:      Head: Normocephalic and atraumatic.      Right Ear: Hearing " normal.      Left Ear: Hearing normal.   Eyes:      Pupils: Pupils are equal, round, and reactive to light.   Cardiovascular:      Rate and Rhythm: Normal rate.   Pulmonary:      Effort: Pulmonary effort is normal.   Musculoskeletal:      Left ankle: She exhibits swelling and ecchymosis. She exhibits no deformity and normal pulse. Tenderness. Lateral malleolus tenderness found. Achilles tendon normal.        Feet:       Comments: Pain with plantar flexion of the left foot.  Full range of motion of the ankle.  Pain with weightbearing   Skin:     General: Skin is warm and dry.   Neurological:      Mental Status: She is alert.      Coordination: Coordination normal.   Psychiatric:         Mood and Affect: Mood normal.     Less than 2 capillary refill and neurovascularly intact distally.  DX ANKLE  FINDINGS:  The alignment of the ankle is normal.  There is minimal lateral soft tissue swelling.  There is no evidence of displaced fracture or dislocation.  There is a small calcaneal plantar spur.        IMPRESSION:     Negative LEFT ankle series.    Patient able to ambulate better with lace up ankle brace.  Assessment/Plan:   Assessment    1. Inversion sprain of left ankle, initial encounter  - DX-ANKLE 3+ VIEWS LEFT; Future  RICE TREATMENT FOR EXTREMITY INJURIES:  R-rest the extremity as much as possible while pain and swelling persist  I-ice the extremity 15 minutes every 2 hours for the first 24 hours, then 4-5 times daily   C-compress the extremity either with splint or ace wrap as directed  E-elevate the extremity to help with swelling    Discussed with patient that if she has symptoms in 7 days or any worsening of symptoms she should follow-up with orthopedic clinic.  Offered referral at this time but she declines.  Lace up ankle brace given to patient for stability.  Offered crutches as well but she declined.    Please note that this dictation was created using voice recognition software. I have made every  reasonable attempt to correct obvious errors, but I expect that there are errors of grammar and possibly content that I did not discover before finalizing the note.    Blu Oneal PA-C

## 2021-09-14 ENCOUNTER — HOSPITAL ENCOUNTER (OUTPATIENT)
Facility: MEDICAL CENTER | Age: 36
End: 2021-09-14
Attending: PHYSICIAN ASSISTANT
Payer: COMMERCIAL

## 2021-09-14 ENCOUNTER — OFFICE VISIT (OUTPATIENT)
Dept: URGENT CARE | Facility: PHYSICIAN GROUP | Age: 36
End: 2021-09-14
Payer: COMMERCIAL

## 2021-09-14 VITALS
HEART RATE: 98 BPM | OXYGEN SATURATION: 96 % | TEMPERATURE: 102 F | BODY MASS INDEX: 24.99 KG/M2 | SYSTOLIC BLOOD PRESSURE: 110 MMHG | DIASTOLIC BLOOD PRESSURE: 68 MMHG | HEIGHT: 65 IN | RESPIRATION RATE: 14 BRPM | WEIGHT: 150 LBS

## 2021-09-14 DIAGNOSIS — J22 LRTI (LOWER RESPIRATORY TRACT INFECTION): ICD-10-CM

## 2021-09-14 DIAGNOSIS — Z20.822 EXPOSURE TO CONFIRMED CASE OF COVID-19: ICD-10-CM

## 2021-09-14 DIAGNOSIS — R06.2 WHEEZE: ICD-10-CM

## 2021-09-14 PROCEDURE — 99214 OFFICE O/P EST MOD 30 MIN: CPT | Mod: CS | Performed by: PHYSICIAN ASSISTANT

## 2021-09-14 PROCEDURE — 0240U HCHG SARS-COV-2 COVID-19 NFCT DS RESP RNA 3 TRGT MIC: CPT

## 2021-09-14 RX ORDER — AZITHROMYCIN 250 MG/1
TABLET, FILM COATED ORAL
Qty: 6 TABLET | Refills: 0 | Status: SHIPPED | OUTPATIENT
Start: 2021-09-14

## 2021-09-14 RX ORDER — ALBUTEROL SULFATE 90 UG/1
2 AEROSOL, METERED RESPIRATORY (INHALATION) EVERY 4 HOURS PRN
Qty: 18.2 G | Refills: 0 | Status: SHIPPED | OUTPATIENT
Start: 2021-09-14

## 2021-09-14 NOTE — PROGRESS NOTES
Chief Complaint   Patient presents with   • Fever     x 1 week    • Nasal Congestion   • Cough   • Body Aches       HISTORY OF PRESENT ILLNESS: Patient is a 35 y.o. female who presents today because she has a 1 week history of fever.  She also has cough, body aches, nasal congestion and headache.  She has been being tested by her employer and has had a negative Covid test, however it was only within 1 day or so of her symptom onset.  She has been taking over-the-counter medications without improvement.  She is not vaccinated for Covid    Patient Active Problem List    Diagnosis Date Noted   • Wellness examination 10/02/2020   • Carpal tunnel syndrome of right wrist 10/02/2020       Allergies:Augmentin and Erythromycin    Current Outpatient Medications Ordered in Epic   Medication Sig Dispense Refill   • azithromycin (ZITHROMAX) 250 MG Tab Follow package directions 6 Tablet 0   • albuterol 108 (90 Base) MCG/ACT Aero Soln inhalation aerosol Inhale 2 Puffs every four hours as needed. With spacer device 18.2 g 0     No current Epic-ordered facility-administered medications on file.       Past Medical History:   Diagnosis Date   • Carpal tunnel syndrome of right wrist 10/2/2020   • Wellness examination 10/2/2020       Social History     Tobacco Use   • Smoking status: Never Smoker   • Smokeless tobacco: Never Used   Vaping Use   • Vaping Use: Never used   Substance Use Topics   • Alcohol use: Yes     Comment: 1-2   • Drug use: No       Family Status   Relation Name Status   • Mo  Alive, age 67y   • Fa  Alive, age 66y        no history   • MGMo  (Not Specified)   • PGMo  (Not Specified)     Family History   Problem Relation Age of Onset   • Arthritis Mother         rheumatoid arthritis   • Lung Disease Mother         bronchiectasis   • Hyperlipidemia Father    • Diabetes Maternal Grandmother    • Cancer Paternal Grandmother         colon       ROS:  Review of Systems   Constitutional: Positive for fever, chills, myalgias  "and malaise/fatigue.   HENT: Negative for ear pain, nosebleeds, positive for nasal congestion, no sore throat and neck pain.    Eyes: Negative for blurred vision.   Respiratory: Positive for cough, occasional sputum production, shortness of breath and wheezing.    Cardiovascular: Negative for chest pain, palpitations, orthopnea and leg swelling.   Gastrointestinal: Negative for heartburn, nausea, vomiting and abdominal pain.   Genitourinary: Negative for dysuria, urgency and frequency.     Exam:  /68   Pulse 98   Temp (!) 38.9 °C (102 °F) (Temporal)   Resp 14   Ht 1.651 m (5' 5\")   Wt 68 kg (150 lb)   SpO2 96%   General:  Well nourished, well developed female in NAD  Head:Normocephalic, atraumatic  Eyes: PERRLA, EOM within normal limits, no conjunctival injection, no scleral icterus, visual fields and acuity grossly intact.  Ears: Normal shape and symmetry, no tenderness, no discharge. External canals are without any significant edema or erythema. Tympanic membranes are without any inflammation, no effusion. Gross auditory acuity is intact  Nose: Symmetrical without tenderness, no discharge.  Mouth: reasonable hygiene, no erythema exudates or tonsillar enlargement.  Neck: no masses, range of motion within normal limits, no tracheal deviation. No obvious thyroid enlargement.  Pulmonary: chest is symmetrical with respiration, scattered rhonchi bilaterally, not clearing with cough, rare wheeze.    Cardiovascular: regular rate and rhythm without murmurs, rubs, or gallops.  Extremities: no clubbing, cyanosis, or edema.    Please note that this dictation was created using voice recognition software. I have made every reasonable attempt to correct obvious errors, but I expect that there are errors of grammar and possibly content that I did not discover before finalizing the note.    Assessment/Plan:  1. Exposure to confirmed case of COVID-19  CoV-2 and Flu A/B by PCR (24 hour In-House): Collect NP swab in VTM "   2. LRTI (lower respiratory tract infection)  azithromycin (ZITHROMAX) 250 MG Tab   3. Wheeze  albuterol 108 (90 Base) MCG/ACT Aero Soln inhalation aerosol   Discuss strict isolation until Covid test returns, over-the-counter symptomatic relief as needed.    Followup with primary care in the next 7-10 days if not significantly improving, return to the urgent care or go to the emergency room sooner for any worsening of symptoms.

## 2021-09-15 LAB
FLUAV RNA SPEC QL NAA+PROBE: NEGATIVE
FLUBV RNA SPEC QL NAA+PROBE: NEGATIVE
SARS-COV-2 RNA RESP QL NAA+PROBE: DETECTED
SPECIMEN SOURCE: ABNORMAL

## 2023-11-17 ENCOUNTER — APPOINTMENT (OUTPATIENT)
Dept: TELEHEALTH | Facility: TELEMEDICINE | Age: 38
End: 2023-11-17

## 2025-01-05 ENCOUNTER — APPOINTMENT (OUTPATIENT)
Dept: TELEHEALTH | Facility: TELEMEDICINE | Age: 40
End: 2025-01-05
Payer: COMMERCIAL

## 2025-01-06 ENCOUNTER — OFFICE VISIT (OUTPATIENT)
Dept: URGENT CARE | Facility: PHYSICIAN GROUP | Age: 40
End: 2025-01-06
Payer: COMMERCIAL

## 2025-01-06 VITALS
DIASTOLIC BLOOD PRESSURE: 72 MMHG | BODY MASS INDEX: 29.11 KG/M2 | OXYGEN SATURATION: 97 % | HEART RATE: 81 BPM | WEIGHT: 169.6 LBS | SYSTOLIC BLOOD PRESSURE: 110 MMHG | TEMPERATURE: 98.5 F

## 2025-01-06 DIAGNOSIS — J22 LOWER RESPIRATORY TRACT INFECTION: ICD-10-CM

## 2025-01-06 PROCEDURE — 99203 OFFICE O/P NEW LOW 30 MIN: CPT | Performed by: NURSE PRACTITIONER

## 2025-01-06 RX ORDER — METHYLPREDNISOLONE 4 MG/1
TABLET ORAL
Qty: 21 TABLET | Refills: 0 | Status: SHIPPED | OUTPATIENT
Start: 2025-01-06

## 2025-01-06 RX ORDER — AZITHROMYCIN 250 MG/1
TABLET, FILM COATED ORAL
Qty: 6 TABLET | Refills: 0 | Status: SHIPPED | OUTPATIENT
Start: 2025-01-06

## 2025-01-07 NOTE — PROGRESS NOTES
Verbal consent was acquired by the patient to use [employer] ambient listening note generation during this visit          Chief Complaint   Patient presents with    Hoarse     2 weeks hoarse voice/ was sick over a month ago. Lost voice. Been using otc meds and lozenges.           History of Present Illness  The patient is a 39-year-old female who presents for evaluation of laryngitis.    She has been experiencing symptoms of a cold or flu, including congestion and a low-grade fever, for approximately one month. These symptoms have persisted despite the use of Tylenol and decongestants. She reports a productive cough with clear sputum, which she attributes to nasal congestion. She is able to breathe through her nose without difficulty. She has been monitoring her sputum for any changes in color, but it remains clear. She has been managing her symptoms with hot teas and throat therapy, which have provided some relief. She has also been using a humidifier and taking turmeric supplements for their anti-inflammatory properties. She continues to take Tylenol and Tylenol PM. She has previously taken steroids and Z-Jim without any adverse reactions. She is allergic to ERYTHROMYCIN. She has been recovering from fatigue and maintains an active lifestyle, including working out for over an hour three to four days a week and horse riding. She has continued to work as she does not have a fever. She is concerned that her symptoms may be indicative of laryngitis. She has been sick since 12/13/2024.    FAMILY HISTORY  Her mother has rheumatoid arthritis.    ALLERGIES  She is allergic to ERYTHROMYCIN.    MEDICATIONS  Current: Tylenol, Tylenol PM, turmeric supplement         ROS:    No severe shortness of breath   No cardiac like chest pain, as discussed   As otherwise stated in HPI    Medical/SX/ Social History:  Reviewed per chart    Pertinent Medications:    Current Outpatient Medications on File Prior to Visit   Medication Sig  Dispense Refill    albuterol 108 (90 Base) MCG/ACT Aero Soln inhalation aerosol Inhale 2 Puffs every four hours as needed. With spacer device 18.2 g 0     No current facility-administered medications on file prior to visit.        Allergies:    Augmentin and Erythromycin     Problem list, medications, and allergies reviewed by myself today in Epic     Physical Exam:    Vitals:    01/06/25 1553   BP: 110/72   Pulse: 81   Temp: 36.9 °C (98.5 °F)   SpO2: 97%             Physical Exam  Vitals and nursing note reviewed.   Constitutional:       General: She is not in acute distress.     Appearance: Normal appearance. She is not ill-appearing or toxic-appearing.   HENT:      Head: Normocephalic and atraumatic.      Right Ear: Tympanic membrane, ear canal and external ear normal.      Left Ear: Tympanic membrane, ear canal and external ear normal.      Nose: Nose normal. No congestion or rhinorrhea.      Mouth/Throat:      Mouth: Mucous membranes are moist.      Pharynx: Oropharynx is clear. Posterior oropharyngeal erythema present.   Eyes:      Extraocular Movements: Extraocular movements intact.      Conjunctiva/sclera: Conjunctivae normal.      Pupils: Pupils are equal, round, and reactive to light.   Cardiovascular:      Rate and Rhythm: Normal rate and regular rhythm.      Pulses: Normal pulses.      Heart sounds: Normal heart sounds.   Pulmonary:      Effort: Pulmonary effort is normal.      Breath sounds: Normal breath sounds. No wheezing or rales.   Musculoskeletal:         General: Normal range of motion.      Cervical back: Normal range of motion and neck supple.   Skin:     General: Skin is warm.      Capillary Refill: Capillary refill takes less than 2 seconds.   Neurological:      General: No focal deficit present.      Mental Status: She is alert and oriented to person, place, and time.            Medical Decision making and plan :  I personally reviewed prior external notes and test results pertinent to  today's visit. Pt is clinically stable at today's acute urgent care visit.  Patient appears nontoxic with no acute distress noted. Appropriate for outpatient care at this time.    Pleasant 39 y.o. female presented clinic with:     Assessment & Plan  1. LRTI, acute.   The patient's symptoms suggest a viral etiology for the laryngitis, which typically resolves over time.  Due to duration of her other symptoms and failure of OTC therapies, antibiotic was written for treatment of bacterial etiology for lower respiratory tract infection. Continue OTC supportive therapies. Flonase, OTC allergy meds, avoid night time dairy, increased fluids and humidification recommended. Patient given precautionary s/sx that mandate immediate follow up and evaluation in the ED. Advised of risks of not doing so. DDX, Supportive care, and indications for immediate follow-up discussed with patient.  Instructed to return to clinic or nearest emergency department if we are not available for any change in condition, further concerns, or worsening of symptoms.    The patient demonstrated a good understanding and agreed with the treatment plan        Shared decision-making was utilized with patient for treatment plan. Medication discussed included indication for use and the potential benefits and side effects. Education was provided regarding the aforementioned assessments.  Differential Diagnosis, natural history, and supportive care discussed. All of the patient's questions were answered to their satisfaction at the time of discharge. Patient was encouraged to monitor symptoms closely. Those signs and symptoms which would warrant concern and mandate seeking a higher level of service through the emergency department discussed at length.  Patient stated agreement and understanding of this plan of care.    Disposition:  Home in stable condition     Voice Recognition Disclaimer:  Portions of this document were created using voice recognition  software and [employer] technology provided by [employer]. The software does have a chance of producing errors of grammar and possibly content. I have made every reasonable attempt to correct obvious errors, but there may be errors of grammar and possibly content that I did not discover before finalizing the  documentation.    EDIE Laboy.